# Patient Record
Sex: MALE | Race: WHITE | Employment: UNEMPLOYED | URBAN - METROPOLITAN AREA
[De-identification: names, ages, dates, MRNs, and addresses within clinical notes are randomized per-mention and may not be internally consistent; named-entity substitution may affect disease eponyms.]

---

## 2017-02-16 ENCOUNTER — TELEPHONE (OUTPATIENT)
Dept: FAMILY MEDICINE CLINIC | Facility: CLINIC | Age: 22
End: 2017-02-16

## 2017-02-16 DIAGNOSIS — Q90.9 DOWN'S SYNDROME: Primary | ICD-10-CM

## 2017-02-17 ENCOUNTER — TELEPHONE (OUTPATIENT)
Dept: OTOLARYNGOLOGY | Facility: CLINIC | Age: 22
End: 2017-02-17

## 2017-02-17 RX ORDER — AZELASTINE 1 MG/ML
1 SPRAY, METERED NASAL 2 TIMES DAILY
Qty: 1 BOTTLE | Refills: 2 | Status: SHIPPED | OUTPATIENT
Start: 2017-02-17 | End: 2017-06-20

## 2017-02-17 RX ORDER — LORATADINE 10 MG/1
10 TABLET ORAL DAILY
Qty: 30 TABLET | Refills: 3 | Status: SHIPPED | OUTPATIENT
Start: 2017-02-17 | End: 2017-06-20

## 2017-02-17 RX ORDER — MONTELUKAST SODIUM 10 MG/1
10 TABLET ORAL NIGHTLY
Qty: 30 TABLET | Refills: 3 | Status: SHIPPED | OUTPATIENT
Start: 2017-02-17 | End: 2017-06-20

## 2017-02-17 NOTE — TELEPHONE ENCOUNTER
Pt's LOV 10/18/16. Pt requesting medication refill. Also, pt rec'd Rx of Azelastine from Dr. Dylan Toscano in 10/2016; pt's father requesting refill of that as well. Please advise if okay to refill Singulair and Loratadine, as well as Azelastine spray.

## 2017-02-17 NOTE — TELEPHONE ENCOUNTER
pts Father, Vanesa Maguire called. Could you please refill, Father states these meds work great for his son and he is completely out. He states there are no more refills. Please advise.      Azelastine 30  spray     Montelukast Sodium 10 MG Oral Tab Take 1 ta

## 2017-06-05 ENCOUNTER — OFFICE VISIT (OUTPATIENT)
Dept: OPHTHALMOLOGY | Facility: CLINIC | Age: 22
End: 2017-06-05

## 2017-06-05 DIAGNOSIS — Q90.9 DOWN'S SYNDROME: ICD-10-CM

## 2017-06-05 DIAGNOSIS — H01.00A BLEPHARITIS OF UPPER AND LOWER EYELIDS OF BOTH EYES, UNSPECIFIED TYPE: Primary | ICD-10-CM

## 2017-06-05 DIAGNOSIS — H01.00B BLEPHARITIS OF UPPER AND LOWER EYELIDS OF BOTH EYES, UNSPECIFIED TYPE: Primary | ICD-10-CM

## 2017-06-05 DIAGNOSIS — H52.203 HYPEROPIA OF BOTH EYES WITH ASTIGMATISM: ICD-10-CM

## 2017-06-05 DIAGNOSIS — H52.03 HYPEROPIA OF BOTH EYES WITH ASTIGMATISM: ICD-10-CM

## 2017-06-05 PROCEDURE — 99243 OFF/OP CNSLTJ NEW/EST LOW 30: CPT | Performed by: OPHTHALMOLOGY

## 2017-06-05 PROCEDURE — 92015 DETERMINE REFRACTIVE STATE: CPT | Performed by: OPHTHALMOLOGY

## 2017-06-05 PROCEDURE — 99212 OFFICE O/P EST SF 10 MIN: CPT | Performed by: OPHTHALMOLOGY

## 2017-06-05 NOTE — PATIENT INSTRUCTIONS
Down's syndrome  Doing well    Hyperopia of both eyes with astigmatism  New Rx    Blepharitis  Patient instructed to use lid hygiene twice daily. Apply baby shampoo to warm washcloth and scrub eyelids gently with eyes closed, then rinse thoroughly.

## 2017-06-05 NOTE — PROGRESS NOTES
Charanjit Shafer is a 20year old male. HPI:     HPI     EP/ 20 yr old here for a complete exam. 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  Pt states that vision is fine, mom states evelia Gastrointestinal, Neurological, Skin, Genitourinary, Musculoskeletal, HENT, Endocrine, Cardiovascular, Respiratory, Psychiatric, Allergic/Imm, Heme/Lymph    Last edited by Mohinder Le O.T. on 6/5/2017  1:05 PM. (History)          PHYSICAL EXAM: twice daily. Apply baby shampoo to warm washcloth and scrub eyelids gently with eyes closed, then rinse thoroughly. No orders of the defined types were placed in this encounter.        Meds This Visit:    No prescriptions requested or ordered in thi

## 2017-06-20 RX ORDER — AZELASTINE 1 MG/ML
1 SPRAY, METERED NASAL 2 TIMES DAILY
Qty: 1 BOTTLE | Refills: 2 | Status: SHIPPED | OUTPATIENT
Start: 2017-06-20 | End: 2017-10-28

## 2017-06-20 RX ORDER — MONTELUKAST SODIUM 10 MG/1
10 TABLET ORAL NIGHTLY
Qty: 30 TABLET | Refills: 3 | Status: SHIPPED | OUTPATIENT
Start: 2017-06-20 | End: 2017-10-28

## 2017-06-20 RX ORDER — LORATADINE 10 MG/1
10 TABLET ORAL DAILY
Qty: 30 TABLET | Refills: 3 | Status: SHIPPED | OUTPATIENT
Start: 2017-06-20 | End: 2017-10-28

## 2017-06-20 NOTE — TELEPHONE ENCOUNTER
pts Mom, Micky Jory called following up on refills, pt is out. She states he has been waiting for days for the refill? Please advise    Montelukast Sodium 10 MG Oral Tab Take 1 tablet (10 mg total) by mouth nightly.  Disp: 30 tablet Rfl: 3   loratadine 10 MG

## 2017-09-01 ENCOUNTER — OFFICE VISIT (OUTPATIENT)
Dept: FAMILY MEDICINE CLINIC | Facility: CLINIC | Age: 22
End: 2017-09-01

## 2017-09-01 VITALS
DIASTOLIC BLOOD PRESSURE: 81 MMHG | HEART RATE: 85 BPM | BODY MASS INDEX: 37.89 KG/M2 | SYSTOLIC BLOOD PRESSURE: 145 MMHG | HEIGHT: 60 IN | WEIGHT: 193 LBS

## 2017-09-01 DIAGNOSIS — Z00.00 WELL ADULT EXAM: Primary | ICD-10-CM

## 2017-09-01 DIAGNOSIS — J30.2 CHRONIC SEASONAL ALLERGIC RHINITIS DUE TO OTHER ALLERGEN: ICD-10-CM

## 2017-09-01 DIAGNOSIS — H52.03 HYPEROPIA OF BOTH EYES WITH ASTIGMATISM: ICD-10-CM

## 2017-09-01 DIAGNOSIS — Q90.9 DOWN'S SYNDROME: ICD-10-CM

## 2017-09-01 DIAGNOSIS — H52.203 HYPEROPIA OF BOTH EYES WITH ASTIGMATISM: ICD-10-CM

## 2017-09-01 PROCEDURE — 99395 PREV VISIT EST AGE 18-39: CPT | Performed by: NURSE PRACTITIONER

## 2017-09-01 NOTE — PROGRESS NOTES
HPI  Pt here for work physical. Will be working at [employer] with  in the building-will need TB test.     Review of Systems   Constitutional: Negative for activity change, appetite change and fever. HENT: Positive for congestion and rhinorrhea.  Neg History  None on file     Social History Main Topics   Smoking status: Never Smoker    Smokeless tobacco: Not on file    Alcohol use No    Drug use: Unknown     Other Topics Concern    Caffeine Concern No     Social History Narrative   None on file noted. No erythema. Psychiatric: He has a normal mood and affect. His behavior is normal. Judgment and thought content normal.   Nursing note and vitals reviewed. Assessment:     1. Well adult exam    2. Down's syndrome    3.  Hyperopia of both eyes

## 2017-09-05 ENCOUNTER — NURSE ONLY (OUTPATIENT)
Dept: FAMILY MEDICINE CLINIC | Facility: CLINIC | Age: 22
End: 2017-09-05

## 2017-09-05 DIAGNOSIS — Z11.1 SCREENING-PULMONARY TB: Primary | ICD-10-CM

## 2017-09-05 PROCEDURE — 86580 TB INTRADERMAL TEST: CPT | Performed by: FAMILY MEDICINE

## 2017-09-05 NOTE — PROGRESS NOTES
Patient is here for his TB test. Patient and mother advise to schedule RN visit for tb reading within 48-72 hours.  Test placed on right forearm at 4:16pm.

## 2017-09-07 ENCOUNTER — NURSE ONLY (OUTPATIENT)
Dept: FAMILY MEDICINE CLINIC | Facility: CLINIC | Age: 22
End: 2017-09-07

## 2017-09-07 DIAGNOSIS — Z11.1 SCREENING-PULMONARY TB: Primary | ICD-10-CM

## 2017-09-07 LAB — INDURATION (): 0 MM (ref 0–11)

## 2017-09-07 NOTE — PROCEDURES
Patient here for nurse visit for TB reading. Mary Genevievejeannie 53Allan name and  were verified with patient. TB site was a little red so site was also checked by Blas Cruz, KAROLINA who verified it as Negative TB.  Results were documented and work physical form was compl

## 2017-10-17 NOTE — TELEPHONE ENCOUNTER
Pt's father called to check on the 2 rx. For refill. Called the pharm and was told they have not heard back from the office. Please send the rx.

## 2017-10-18 RX ORDER — MONTELUKAST SODIUM 10 MG/1
TABLET ORAL
Qty: 30 TABLET | Refills: 3 | OUTPATIENT
Start: 2017-10-18

## 2017-10-18 RX ORDER — LORATADINE 10 MG/1
TABLET ORAL
Qty: 30 TABLET | Refills: 3 | OUTPATIENT
Start: 2017-10-18

## 2017-10-18 NOTE — TELEPHONE ENCOUNTER
Pts father scheduled appt on 10/24, asking if he can get rx until appt. Upset he didn't get cb yesterday.

## 2017-10-21 ENCOUNTER — TELEPHONE (OUTPATIENT)
Dept: OTOLARYNGOLOGY | Facility: CLINIC | Age: 22
End: 2017-10-21

## 2017-10-23 RX ORDER — MONTELUKAST SODIUM 10 MG/1
TABLET ORAL
Qty: 30 TABLET | Refills: 0 | OUTPATIENT
Start: 2017-10-23

## 2017-10-23 RX ORDER — LORATADINE 10 MG/1
TABLET ORAL
Qty: 30 TABLET | Refills: 0 | OUTPATIENT
Start: 2017-10-23

## 2017-10-27 ENCOUNTER — TELEPHONE (OUTPATIENT)
Dept: OTHER | Age: 22
End: 2017-10-27

## 2017-10-27 DIAGNOSIS — H61.20 IMPACTED CERUMEN, UNSPECIFIED LATERALITY: Primary | ICD-10-CM

## 2017-10-27 NOTE — TELEPHONE ENCOUNTER
Referral sent, Pt visits regularly and annually Hx sinus /wax build up  Left message with Pt since his father was not available- referral sent -Appt is tomorrow

## 2017-10-27 NOTE — TELEPHONE ENCOUNTER
Informed pt's dad Dr. Hayden Garay has left the office, he asked to have referral request sent to Dr. Camden Moreau. Pt does have an appt scheduled 9:30 AM tomorrow. Please advise.

## 2017-10-27 NOTE — TELEPHONE ENCOUNTER
Dad called to get a referral for Dr. Ivey. State pt is having a problem with ears, has a lot of wax in his ears and is having some dizziness.  Pt has an appt scheduled Tuesday 10/21/17 and needs this referral, would like pt seen tomorrow and Dr. Ivey do

## 2017-10-28 ENCOUNTER — OFFICE VISIT (OUTPATIENT)
Dept: OTOLARYNGOLOGY | Facility: CLINIC | Age: 22
End: 2017-10-28

## 2017-10-28 ENCOUNTER — TELEPHONE (OUTPATIENT)
Dept: OTOLARYNGOLOGY | Facility: CLINIC | Age: 22
End: 2017-10-28

## 2017-10-28 VITALS — HEIGHT: 60 IN | BODY MASS INDEX: 37.89 KG/M2 | WEIGHT: 193 LBS | TEMPERATURE: 98 F

## 2017-10-28 DIAGNOSIS — H61.23 BILATERAL IMPACTED CERUMEN: Primary | ICD-10-CM

## 2017-10-28 DIAGNOSIS — J30.89 CHRONIC NON-SEASONAL ALLERGIC RHINITIS, UNSPECIFIED TRIGGER: ICD-10-CM

## 2017-10-28 PROCEDURE — 69210 REMOVE IMPACTED EAR WAX UNI: CPT | Performed by: OTOLARYNGOLOGY

## 2017-10-28 PROCEDURE — 99213 OFFICE O/P EST LOW 20 MIN: CPT | Performed by: OTOLARYNGOLOGY

## 2017-10-28 PROCEDURE — 99212 OFFICE O/P EST SF 10 MIN: CPT | Performed by: OTOLARYNGOLOGY

## 2017-10-28 RX ORDER — AZELASTINE 1 MG/ML
1 SPRAY, METERED NASAL 2 TIMES DAILY
Qty: 1 BOTTLE | Refills: 2 | Status: CANCELLED | OUTPATIENT
Start: 2017-10-28

## 2017-10-28 RX ORDER — MONTELUKAST SODIUM 10 MG/1
10 TABLET ORAL NIGHTLY
Qty: 30 TABLET | Refills: 3 | Status: SHIPPED | OUTPATIENT
Start: 2017-10-28 | End: 2017-10-28

## 2017-10-28 RX ORDER — AZELASTINE 1 MG/ML
1 SPRAY, METERED NASAL 2 TIMES DAILY
Qty: 1 BOTTLE | Refills: 11 | Status: SHIPPED | OUTPATIENT
Start: 2017-10-28 | End: 2020-03-30

## 2017-10-28 RX ORDER — MONTELUKAST SODIUM 10 MG/1
10 TABLET ORAL NIGHTLY
Qty: 30 TABLET | Refills: 11 | Status: SHIPPED | OUTPATIENT
Start: 2017-10-28 | End: 2020-01-20 | Stop reason: ALTCHOICE

## 2017-10-28 RX ORDER — LORATADINE 10 MG/1
10 TABLET ORAL DAILY
Qty: 30 TABLET | Refills: 11 | Status: SHIPPED | OUTPATIENT
Start: 2017-10-28 | End: 2018-03-30

## 2017-10-28 RX ORDER — LORATADINE 10 MG/1
10 TABLET ORAL DAILY
Qty: 30 TABLET | Refills: 3 | Status: SHIPPED | OUTPATIENT
Start: 2017-10-28 | End: 2017-10-28

## 2017-10-28 NOTE — PROGRESS NOTES
Luma Sweet is a 20year old male. Patient presents with:  Ear Problem: Pt experience dizziness, pain in both ears x1 day  Refill Request        HISTORY OF PRESENT ILLNESS  10/28/2017   Here for evaluation of   hearing loss.  Patient feels this has wo LASER-ASSISTED      REVIEW OF SYSTEMS    System Neg/Pos Details   Constitutional Negative fever, weight loss. ENMT Negative Headaches vertigo    Eyes Negative Blurred vision and vision changes. Respiratory Negative Dyspnea and wheezing.    Cardio Reynaldo Amirah total) by mouth daily. , Disp: 30 tablet, Rfl: 11  •  Montelukast Sodium 10 MG Oral Tab, Take 1 tablet (10 mg total) by mouth nightly., Disp: 30 tablet, Rfl: 11  •  Azelastine HCl 0.1 % Nasal Solution, 1 spray by Nasal route 2 (two) times daily. , Disp: 1 Durga

## 2017-11-04 RX ORDER — AZELASTINE 1 MG/ML
SPRAY, METERED NASAL
Qty: 30 ML | Refills: 3 | Status: SHIPPED | OUTPATIENT
Start: 2017-11-04 | End: 2018-06-18

## 2017-11-04 NOTE — TELEPHONE ENCOUNTER
Dr [facility] to advise on pended fluticasone 50 mcg refill request. Not on med list.    Refill Protocol Appointment Criteria  · Appointment scheduled in the past 12 months or in the next 3 months  Recent Outpatient Visits            1 week ago Bilateral impacted

## 2017-11-07 RX ORDER — FLUTICASONE PROPIONATE 50 MCG
SPRAY, SUSPENSION (ML) NASAL
Qty: 3 INHALER | Refills: 3 | OUTPATIENT
Start: 2017-11-07

## 2017-11-08 NOTE — TELEPHONE ENCOUNTER
Routing comment from Dr Rhonda Sandy 11/6/17 Please ask mo if taking flonase or dymista cant take both.  (Routing comment)   Parent contacted, she states that they do not need a refill on nasal spray, erx was cancelled

## 2018-02-20 RX ORDER — LORATADINE 10 MG/1
TABLET ORAL
Qty: 30 TABLET | Refills: 3 | Status: SHIPPED | OUTPATIENT
Start: 2018-02-20 | End: 2020-10-27

## 2018-02-20 NOTE — TELEPHONE ENCOUNTER
Pt last office visit on 10/28/17 for allergic non seasonal rhinitis. Pt requesting a refill.  Please advise

## 2018-03-30 ENCOUNTER — LAB ENCOUNTER (OUTPATIENT)
Dept: LAB | Age: 23
End: 2018-03-30
Attending: PHYSICIAN ASSISTANT
Payer: COMMERCIAL

## 2018-03-30 ENCOUNTER — OFFICE VISIT (OUTPATIENT)
Dept: FAMILY MEDICINE CLINIC | Facility: CLINIC | Age: 23
End: 2018-03-30

## 2018-03-30 VITALS
HEIGHT: 60 IN | SYSTOLIC BLOOD PRESSURE: 128 MMHG | WEIGHT: 198 LBS | HEART RATE: 80 BPM | DIASTOLIC BLOOD PRESSURE: 82 MMHG | BODY MASS INDEX: 38.87 KG/M2

## 2018-03-30 DIAGNOSIS — M10.9 ACUTE GOUT INVOLVING TOE OF RIGHT FOOT, UNSPECIFIED CAUSE: ICD-10-CM

## 2018-03-30 DIAGNOSIS — M10.9 ACUTE GOUT INVOLVING TOE OF RIGHT FOOT, UNSPECIFIED CAUSE: Primary | ICD-10-CM

## 2018-03-30 PROCEDURE — 99212 OFFICE O/P EST SF 10 MIN: CPT | Performed by: PHYSICIAN ASSISTANT

## 2018-03-30 PROCEDURE — 80053 COMPREHEN METABOLIC PANEL: CPT

## 2018-03-30 PROCEDURE — 36415 COLL VENOUS BLD VENIPUNCTURE: CPT

## 2018-03-30 PROCEDURE — 84550 ASSAY OF BLOOD/URIC ACID: CPT

## 2018-03-30 PROCEDURE — 80050 GENERAL HEALTH PANEL: CPT

## 2018-03-30 PROCEDURE — 99213 OFFICE O/P EST LOW 20 MIN: CPT | Performed by: PHYSICIAN ASSISTANT

## 2018-03-30 PROCEDURE — 84443 ASSAY THYROID STIM HORMONE: CPT

## 2018-03-30 RX ORDER — INDOMETHACIN 50 MG/1
50 CAPSULE ORAL
Qty: 30 CAPSULE | Refills: 0 | Status: SHIPPED | OUTPATIENT
Start: 2018-03-30 | End: 2018-09-10

## 2018-03-30 NOTE — PROGRESS NOTES
HPI:    Patient ID: Gayatri Mathews is a 24year old male. Patient presents with his father for evaluation of painful right great toe for past three days. Patient denies any injury. He has not noticed redness or swelling in toe.   He denies pain prox well-developed and well-nourished. No distress. Cardiovascular: Normal rate, regular rhythm and normal heart sounds. Pulmonary/Chest: Effort normal and breath sounds normal.   Musculoskeletal:   Right great toe at MTP joint tender to palpation.   Pain

## 2018-04-02 ENCOUNTER — TELEPHONE (OUTPATIENT)
Dept: FAMILY MEDICINE CLINIC | Facility: CLINIC | Age: 23
End: 2018-04-02

## 2018-04-02 DIAGNOSIS — E03.9 HYPOTHYROIDISM, UNSPECIFIED TYPE: ICD-10-CM

## 2018-04-02 DIAGNOSIS — R74.8 ELEVATED LIVER ENZYMES: Primary | ICD-10-CM

## 2018-04-02 RX ORDER — LEVOTHYROXINE SODIUM 0.03 MG/1
25 TABLET ORAL
Qty: 30 TABLET | Refills: 2 | Status: SHIPPED | OUTPATIENT
Start: 2018-04-02 | End: 2020-01-20 | Stop reason: ALTCHOICE

## 2018-04-02 NOTE — TELEPHONE ENCOUNTER
Left detailed message regarding lab results. Uric acid level is elevated. Patient should follow up if toe pain is not improving with indomethacin. He should adhere to low purine diet and follow up if any joint pain recurs. His TSH level is high at 12. 0

## 2018-06-18 ENCOUNTER — TELEPHONE (OUTPATIENT)
Dept: FAMILY MEDICINE CLINIC | Facility: CLINIC | Age: 23
End: 2018-06-18

## 2018-06-18 DIAGNOSIS — R09.81 SINUS CONGESTION: Primary | ICD-10-CM

## 2018-06-18 DIAGNOSIS — H61.20 IMPACTED CERUMEN, UNSPECIFIED LATERALITY: ICD-10-CM

## 2018-06-18 NOTE — TELEPHONE ENCOUNTER
Spoke with pt's father, Carly Ashby, and informed that Dr Levi Santiago would like to see the pt in the office since he has not been seen since 2016, pt's dad would like to know if there is any way you could refill his nasal spray for now and he will schedule an appoin

## 2018-06-18 NOTE — TELEPHONE ENCOUNTER
Patients father checking status on nasal spray refill. Would also like another refill on the montelukast and loratadine. Please advise and call once pharm receives medication.  Thank you

## 2018-06-18 NOTE — TELEPHONE ENCOUNTER
Patient called requesting a referral to see Dr. Carmen Maki for a possible apt tomorrow. Pended referral. Please review diagnosis and sign off if you agree.     Thank you,  [facility] [phone]

## 2018-06-19 RX ORDER — AZELASTINE 1 MG/ML
SPRAY, METERED NASAL
Qty: 30 ML | Refills: 3 | Status: SHIPPED | OUTPATIENT
Start: 2018-06-19 | End: 2018-11-24

## 2018-06-20 RX ORDER — LORATADINE 10 MG/1
10 TABLET ORAL DAILY
Qty: 30 TABLET | Refills: 2 | Status: SHIPPED | OUTPATIENT
Start: 2018-06-20 | End: 2020-01-20

## 2018-06-20 RX ORDER — MONTELUKAST SODIUM 10 MG/1
10 TABLET ORAL NIGHTLY
Qty: 30 TABLET | Refills: 2 | Status: SHIPPED | OUTPATIENT
Start: 2018-06-20 | End: 2020-01-20

## 2018-06-20 NOTE — TELEPHONE ENCOUNTER
Left message for pt to call back to inform that nasal spray, loratadine, and singulair were all sent to his pharmacy with 2 refills.

## 2018-06-20 NOTE — TELEPHONE ENCOUNTER
He may have a refill on his loratadine and Singulair as well 10 mg each #30 each with 2 refills.   They can schedule an appointment to see me at their convenience

## 2018-07-19 RX ORDER — LORATADINE 10 MG/1
TABLET ORAL
Qty: 30 TABLET | Refills: 3 | OUTPATIENT
Start: 2018-07-19

## 2018-07-20 NOTE — TELEPHONE ENCOUNTER
He can purchase this medication over-the-counter without seeing me. If he wishes to have the medication prescribed I would recommend that he schedule appointment to see me.

## 2018-09-10 ENCOUNTER — TELEPHONE (OUTPATIENT)
Dept: FAMILY MEDICINE CLINIC | Facility: CLINIC | Age: 23
End: 2018-09-10

## 2018-09-10 RX ORDER — INDOMETHACIN 50 MG/1
50 CAPSULE ORAL
Qty: 90 CAPSULE | Refills: 0 | Status: SHIPPED | OUTPATIENT
Start: 2018-09-10 | End: 2021-07-20

## 2018-09-10 NOTE — TELEPHONE ENCOUNTER
Father states pt has Down syndrome and dropped a full bottle of indomethacin in the toilette (d/t deformity of fingers) and just needs a refill. States the indomethacin is used when pt has a gout flare up but does not believe an OV is needed.  Repeatedly st

## 2018-09-11 NOTE — TELEPHONE ENCOUNTER
Spoke with father of patient and informed him that the rx for the indomethacin was sent to the pharmacy. Father voiced understanding.

## 2018-10-19 RX ORDER — LORATADINE 10 MG/1
TABLET ORAL
Qty: 30 TABLET | Refills: 3 | OUTPATIENT
Start: 2018-10-19

## 2018-10-23 NOTE — TELEPHONE ENCOUNTER
, pt asking if he can have 1 months refill for Singulair and Claritin, pt will schedule appointment , please advise

## 2018-10-23 NOTE — TELEPHONE ENCOUNTER
Patients father checking refill on montelukast and loratadine. States pharm is not able to refill. Please call. Thank you.

## 2018-10-24 RX ORDER — LORATADINE 10 MG/1
10 TABLET ORAL DAILY
Qty: 30 TABLET | Refills: 0 | Status: SHIPPED | OUTPATIENT
Start: 2018-10-24 | End: 2018-11-24

## 2018-10-24 RX ORDER — MONTELUKAST SODIUM 10 MG/1
10 TABLET ORAL NIGHTLY
Qty: 30 TABLET | Refills: 0 | Status: SHIPPED | OUTPATIENT
Start: 2018-10-24 | End: 2018-11-24

## 2018-11-01 NOTE — TELEPHONE ENCOUNTER
Spoke to pt's father who is his caregiver and father stts that the medication is prescribed by Dr. Angelique Haas (ENT) not Dr. Francisca Cruz.  Please advise

## 2018-11-02 RX ORDER — FLUTICASONE PROPIONATE 50 MCG
SPRAY, SUSPENSION (ML) NASAL
Refills: 0 | OUTPATIENT
Start: 2018-11-02

## 2018-11-24 ENCOUNTER — OFFICE VISIT (OUTPATIENT)
Dept: OTOLARYNGOLOGY | Facility: CLINIC | Age: 23
End: 2018-11-24
Payer: MEDICAID

## 2018-11-24 VITALS
TEMPERATURE: 98 F | BODY MASS INDEX: 36.52 KG/M2 | DIASTOLIC BLOOD PRESSURE: 80 MMHG | WEIGHT: 186 LBS | SYSTOLIC BLOOD PRESSURE: 120 MMHG | HEIGHT: 60 IN

## 2018-11-24 DIAGNOSIS — R09.81 NASAL CONGESTION: Primary | ICD-10-CM

## 2018-11-24 DIAGNOSIS — H61.22 IMPACTED CERUMEN OF LEFT EAR: ICD-10-CM

## 2018-11-24 PROCEDURE — 69210 REMOVE IMPACTED EAR WAX UNI: CPT | Performed by: OTOLARYNGOLOGY

## 2018-11-24 PROCEDURE — 99214 OFFICE O/P EST MOD 30 MIN: CPT | Performed by: OTOLARYNGOLOGY

## 2018-11-24 PROCEDURE — 99212 OFFICE O/P EST SF 10 MIN: CPT | Performed by: OTOLARYNGOLOGY

## 2018-11-24 RX ORDER — METHSCOPOLAMINE BROMIDE 2.5 MG/1
2.5 TABLET ORAL 2 TIMES DAILY
Qty: 60 TABLET | Refills: 3 | Status: SHIPPED | OUTPATIENT
Start: 2018-11-24 | End: 2020-01-20 | Stop reason: ALTCHOICE

## 2018-11-24 RX ORDER — AZELASTINE 1 MG/ML
SPRAY, METERED NASAL
Qty: 3 BOTTLE | Refills: 3 | Status: SHIPPED | OUTPATIENT
Start: 2018-11-24 | End: 2020-01-20

## 2018-11-24 RX ORDER — MONTELUKAST SODIUM 10 MG/1
10 TABLET ORAL NIGHTLY
Qty: 90 TABLET | Refills: 3 | Status: SHIPPED | OUTPATIENT
Start: 2018-11-24 | End: 2020-01-20

## 2018-11-24 RX ORDER — LORATADINE 10 MG/1
10 TABLET ORAL DAILY
Qty: 90 TABLET | Refills: 0 | Status: SHIPPED | OUTPATIENT
Start: 2018-11-24 | End: 2019-02-25

## 2018-11-24 NOTE — PROGRESS NOTES
Delphine Veras is a 24year old male.   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 Supraclavicular.         Nose/Mouth/Throat Normal External nose - Normal. Lips/teeth/gums - Normal. Tonsils - Normal. Oropharynx - Normal.   Nose/Mouth/Throat Normal Nares - Right: Normal Left: Normal. Septum -Normal  Turbinates - Right: Normal, L congestion  Continue Singulair loratadine and Astelin nasal spray. I will start him on methscopolamine to be used as needed for chronic postnasal discharge. Return to clinic in 1 year for routine refills.     2. Impacted cerumen of left ear  Left ear ceru

## 2019-02-25 ENCOUNTER — TELEPHONE (OUTPATIENT)
Dept: OTOLARYNGOLOGY | Facility: CLINIC | Age: 24
End: 2019-02-25

## 2019-02-27 RX ORDER — LORATADINE 10 MG/1
TABLET ORAL
Qty: 90 TABLET | Refills: 1 | Status: SHIPPED | OUTPATIENT
Start: 2019-02-27 | End: 2019-08-22

## 2019-08-22 RX ORDER — LORATADINE 10 MG/1
TABLET ORAL
Qty: 90 TABLET | Refills: 0 | Status: SHIPPED | OUTPATIENT
Start: 2019-08-22 | End: 2020-01-20

## 2019-11-20 NOTE — TELEPHONE ENCOUNTER
Pt's father called. Caller had requested refills for 2 rx. [facility]ru [facility]. Pt is out of the rx. Will you refill?   Please call

## 2019-11-21 RX ORDER — LORATADINE 10 MG/1
TABLET ORAL
Qty: 90 TABLET | Refills: 0 | OUTPATIENT
Start: 2019-11-21

## 2019-11-21 RX ORDER — MONTELUKAST SODIUM 10 MG/1
TABLET ORAL
Qty: 90 TABLET | Refills: 0 | OUTPATIENT
Start: 2019-11-21

## 2019-12-27 ENCOUNTER — TELEPHONE (OUTPATIENT)
Dept: FAMILY MEDICINE CLINIC | Facility: CLINIC | Age: 24
End: 2019-12-27

## 2019-12-27 NOTE — TELEPHONE ENCOUNTER
Called and spoke to pt's mother, Yolie Valverde, informed her that Dr. [facility] does not have any sooner appts and will have to wait until January 20th to get forms signed by him. Otherwise, she can see Dr. ELGIN LYNN Niles AT [facility] physician assistant or nurse practitioner.    Per Yolie Valverde,

## 2019-12-27 NOTE — TELEPHONE ENCOUNTER
Patient mom calling and states she have some forms that need to be filled out for her son for insurance purpose he have Down Syndrome she will like to drop the papers off his appointment is not until January 20 but want to have them filled out before the a

## 2020-01-20 ENCOUNTER — OFFICE VISIT (OUTPATIENT)
Dept: FAMILY MEDICINE CLINIC | Facility: CLINIC | Age: 25
End: 2020-01-20
Payer: MEDICAID

## 2020-01-20 VITALS
HEIGHT: 60 IN | BODY MASS INDEX: 34.16 KG/M2 | HEART RATE: 73 BPM | DIASTOLIC BLOOD PRESSURE: 68 MMHG | WEIGHT: 174 LBS | RESPIRATION RATE: 20 BRPM | SYSTOLIC BLOOD PRESSURE: 114 MMHG

## 2020-01-20 DIAGNOSIS — E03.9 HYPOTHYROIDISM, UNSPECIFIED TYPE: ICD-10-CM

## 2020-01-20 DIAGNOSIS — Z23 ENCOUNTER FOR IMMUNIZATION: ICD-10-CM

## 2020-01-20 DIAGNOSIS — H52.03 HYPEROPIA OF BOTH EYES WITH ASTIGMATISM: ICD-10-CM

## 2020-01-20 DIAGNOSIS — H52.203 HYPEROPIA OF BOTH EYES WITH ASTIGMATISM: ICD-10-CM

## 2020-01-20 DIAGNOSIS — Z00.00 ANNUAL PHYSICAL EXAM: Primary | ICD-10-CM

## 2020-01-20 PROCEDURE — 90715 TDAP VACCINE 7 YRS/> IM: CPT | Performed by: FAMILY MEDICINE

## 2020-01-20 PROCEDURE — 99385 PREV VISIT NEW AGE 18-39: CPT | Performed by: FAMILY MEDICINE

## 2020-01-20 PROCEDURE — 90471 IMMUNIZATION ADMIN: CPT | Performed by: FAMILY MEDICINE

## 2020-01-20 NOTE — PROGRESS NOTES
Here for phsyical    Hx of downs syndrome  And thyroid disease    Patient's past medical surgical family social history was reviewed.     Review of Systems  Allergic: no environmental allergies or food allergies  Cardiovascular: no chest pain, irregular hea PEROXIDASE (TPO) AB; Future  - FREE T4 (FREE THYROXINE); Future  - TRIIODOTHYRONINE (T3) TOTAL; Future  - COMP METABOLIC PANEL (14); Future  - LIPID PANEL; Future  - CBC WITH DIFFERENTIAL WITH PLATELET;  Future  - TETANUS, DIPHTHERIA TOXOIDS AND ACELLULAR P

## 2020-03-04 RX ORDER — AZELASTINE 1 MG/ML
SPRAY, METERED NASAL
Qty: 90 ML | Refills: 0 | OUTPATIENT
Start: 2020-03-04

## 2020-03-12 ENCOUNTER — TELEPHONE (OUTPATIENT)
Dept: FAMILY MEDICINE CLINIC | Facility: CLINIC | Age: 25
End: 2020-03-12

## 2020-03-12 DIAGNOSIS — R09.81 SINUS CONGESTION: Primary | ICD-10-CM

## 2020-03-12 NOTE — TELEPHONE ENCOUNTER
Father is requesting a referral for the patient to see Dr. Yolie Mendosa in ENT for follow up on medication. Father, tried calling for a refill, but, was told that the patient needs to see the doctor first for his refill.  Pt does not have an appointment scheduled

## 2020-03-12 NOTE — TELEPHONE ENCOUNTER
Dr. Kayla Marquez, patient is requesting a referral for Dr. Katie Maza. Referral has been pended, please advise.

## 2020-03-16 NOTE — TELEPHONE ENCOUNTER
Dr Johnson Niece patient father stated he was trying to schedule pt to see you but now cannot do it due to COVID 19 pt has down syndrome,pt is out of medication pt father is asking if pt can get a refill,please advise.

## 2020-03-17 RX ORDER — AZELASTINE 1 MG/ML
1 SPRAY, METERED NASAL 2 TIMES DAILY
Qty: 1 BOTTLE | Refills: 1 | Status: SHIPPED | OUTPATIENT
Start: 2020-03-17

## 2020-03-18 ENCOUNTER — TELEPHONE (OUTPATIENT)
Dept: OTOLARYNGOLOGY | Facility: CLINIC | Age: 25
End: 2020-03-18

## 2020-03-18 NOTE — TELEPHONE ENCOUNTER
Pt needs clarification on azelastine quantity. Please call thank you. Aware office is closed for the day.

## 2020-03-30 ENCOUNTER — TELEPHONE (OUTPATIENT)
Dept: OTOLARYNGOLOGY | Facility: CLINIC | Age: 25
End: 2020-03-30

## 2020-03-30 RX ORDER — AZELASTINE 1 MG/ML
1 SPRAY, METERED NASAL 2 TIMES DAILY
Qty: 1 BOTTLE | Refills: 3 | Status: SHIPPED | OUTPATIENT
Start: 2020-03-30 | End: 2020-10-27

## 2020-03-30 NOTE — TELEPHONE ENCOUNTER
[facility] called. Pt was given a 2 months supply azelastine nasal spray. Pt is asking for a 3 months supply.    Call to advise

## 2020-10-27 ENCOUNTER — NURSE TRIAGE (OUTPATIENT)
Dept: INTERNAL MEDICINE CLINIC | Facility: CLINIC | Age: 25
End: 2020-10-27

## 2020-10-27 ENCOUNTER — TELEMEDICINE (OUTPATIENT)
Dept: FAMILY MEDICINE CLINIC | Facility: CLINIC | Age: 25
End: 2020-10-27
Payer: MEDICAID

## 2020-10-27 DIAGNOSIS — R09.81 SINUS CONGESTION: Primary | ICD-10-CM

## 2020-10-27 RX ORDER — AZELASTINE 1 MG/ML
2 SPRAY, METERED NASAL 2 TIMES DAILY
Qty: 1 BOTTLE | Refills: 5 | Status: SHIPPED | OUTPATIENT
Start: 2020-10-27 | End: 2020-10-27

## 2020-10-27 RX ORDER — LORATADINE 10 MG/1
10 TABLET ORAL DAILY
Qty: 90 TABLET | Refills: 3 | Status: SHIPPED | OUTPATIENT
Start: 2020-10-27 | End: 2021-08-25

## 2020-10-27 RX ORDER — MONTELUKAST SODIUM 10 MG/1
10 TABLET ORAL NIGHTLY
Qty: 90 TABLET | Refills: 1 | Status: SHIPPED | OUTPATIENT
Start: 2020-10-27 | End: 2021-04-27

## 2020-10-27 NOTE — PROGRESS NOTES
Phone visit    Patient interviewed through father. Patient has had sinus issues for years. Nasal congestion has been worse lately but responding to Mucinex. Patient has not taken Claritin, Singulair or Astelin spray he was previously using.   No cough no

## 2020-10-27 NOTE — TELEPHONE ENCOUNTER
Per Dr. Niko Davies   The father was called and changed. Per the father only has a land line that works. Visit changed.

## 2021-02-04 ENCOUNTER — OFFICE VISIT (OUTPATIENT)
Dept: OPHTHALMOLOGY | Facility: CLINIC | Age: 26
End: 2021-02-04
Payer: MEDICAID

## 2021-02-04 DIAGNOSIS — H52.203 HYPEROPIA OF BOTH EYES WITH ASTIGMATISM: Primary | ICD-10-CM

## 2021-02-04 DIAGNOSIS — Q90.9 DOWN'S SYNDROME: ICD-10-CM

## 2021-02-04 DIAGNOSIS — H52.03 HYPEROPIA OF BOTH EYES WITH ASTIGMATISM: Primary | ICD-10-CM

## 2021-02-04 DIAGNOSIS — H49.11 SUPERIOR OBLIQUE PALSY, RIGHT: ICD-10-CM

## 2021-02-04 PROCEDURE — 92015 DETERMINE REFRACTIVE STATE: CPT | Performed by: OPHTHALMOLOGY

## 2021-02-04 PROCEDURE — 92004 COMPRE OPH EXAM NEW PT 1/>: CPT | Performed by: OPHTHALMOLOGY

## 2021-02-04 NOTE — PROGRESS NOTES
Radha Bruce is a 31year old male. HPI:     HPI     Pt in today for a complete eye exam. Pt's last eye exam was on 6/5/17 with [facility]. Pt states vision is stable and mom denies any ocular issues. Pt has Down's Syndrome.     Last edited by Chris Morales EXAM:     Base Eye Exam     Visual Acuity (Snellen - Linear)       Right Left    Dist cc 20/40 +1 20/40 +1    Dist ph cc NI NI    Near cc 20/50+ 20/50+    Correction: Glasses          Tonometry (Icare, 3:46 PM)       Right Left    Pressure 10 10          P palsy- right eye- probably congenital   Congenital. No treatment. Hyperopia of both eyes with astigmatism  New glasses today; update as needed. Discussed that new prescription is only a slight change. Down's syndrome  Doing well.        No orders

## 2021-02-04 NOTE — PATIENT INSTRUCTIONS
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 No treatment. Hyperopia of both eyes with astigmatism  New glasses today; update as needed. Discussed that new prescription is only a slight change.        Down's syndrome  Doing well

## 2021-04-26 NOTE — TELEPHONE ENCOUNTER
Current Outpatient Medications:      Montelukast 10 MG Tablets   Qty 90   Take 1 tablet 10Mg by mouth every night

## 2021-04-27 RX ORDER — MONTELUKAST SODIUM 10 MG/1
10 TABLET ORAL NIGHTLY
Qty: 90 TABLET | Refills: 0 | Status: SHIPPED | OUTPATIENT
Start: 2021-04-27 | End: 2021-07-26

## 2021-07-20 ENCOUNTER — TELEPHONE (OUTPATIENT)
Dept: ALLERGY | Facility: CLINIC | Age: 26
End: 2021-07-20

## 2021-07-20 ENCOUNTER — OFFICE VISIT (OUTPATIENT)
Dept: FAMILY MEDICINE CLINIC | Facility: CLINIC | Age: 26
End: 2021-07-20
Payer: MEDICAID

## 2021-07-20 VITALS
WEIGHT: 216.19 LBS | SYSTOLIC BLOOD PRESSURE: 122 MMHG | HEART RATE: 93 BPM | BODY MASS INDEX: 42.44 KG/M2 | HEIGHT: 60 IN | DIASTOLIC BLOOD PRESSURE: 73 MMHG

## 2021-07-20 DIAGNOSIS — Z91.09 ENVIRONMENTAL ALLERGIES: Primary | ICD-10-CM

## 2021-07-20 PROCEDURE — 3008F BODY MASS INDEX DOCD: CPT | Performed by: FAMILY MEDICINE

## 2021-07-20 PROCEDURE — 3078F DIAST BP <80 MM HG: CPT | Performed by: FAMILY MEDICINE

## 2021-07-20 PROCEDURE — 3074F SYST BP LT 130 MM HG: CPT | Performed by: FAMILY MEDICINE

## 2021-07-20 PROCEDURE — 99214 OFFICE O/P EST MOD 30 MIN: CPT | Performed by: FAMILY MEDICINE

## 2021-07-20 RX ORDER — DOXYCYCLINE HYCLATE 100 MG
100 TABLET ORAL 2 TIMES DAILY
Qty: 20 TABLET | Refills: 0 | Status: SHIPPED | OUTPATIENT
Start: 2021-07-20

## 2021-07-20 NOTE — TELEPHONE ENCOUNTER
Left a message for mother of patient regarding appointment to contact our office. Patient may remain on allergy medications, but will not be able to skin test. Dr. Pina Russo will assess at the time of visit and will address plan of care.

## 2021-07-20 NOTE — TELEPHONE ENCOUNTER
Patient is scheduled for a Consult on 8/25 . Patient is unable to hold antihistamines 5 days prior.      Mother explains pt gags an almost throws up when not on medication

## 2021-07-20 NOTE — PROGRESS NOTES
Blood pressure 122/73, pulse 93, height 4' 11\" (1.499 m), weight 216 lb 3.2 oz (98.1 kg). Patient presents today with mother reporting that he has had a cough whenever he goes outside for the past couple of months.   Patient reports that he coughs u

## 2021-07-26 RX ORDER — MONTELUKAST SODIUM 10 MG/1
TABLET ORAL
Qty: 90 TABLET | Refills: 0 | Status: SHIPPED | OUTPATIENT
Start: 2021-07-26 | End: 2021-10-25

## 2021-08-25 ENCOUNTER — OFFICE VISIT (OUTPATIENT)
Dept: ALLERGY | Facility: CLINIC | Age: 26
End: 2021-08-25
Payer: MEDICAID

## 2021-08-25 ENCOUNTER — LAB ENCOUNTER (OUTPATIENT)
Dept: LAB | Age: 26
End: 2021-08-25
Attending: ALLERGY & IMMUNOLOGY
Payer: COMMERCIAL

## 2021-08-25 VITALS
RESPIRATION RATE: 18 BRPM | WEIGHT: 216 LBS | SYSTOLIC BLOOD PRESSURE: 111 MMHG | HEART RATE: 71 BPM | BODY MASS INDEX: 42.41 KG/M2 | DIASTOLIC BLOOD PRESSURE: 71 MMHG | HEIGHT: 60 IN | OXYGEN SATURATION: 95 %

## 2021-08-25 DIAGNOSIS — R09.82 POST-NASAL DRIP: ICD-10-CM

## 2021-08-25 DIAGNOSIS — J30.89 PERENNIAL ALLERGIC RHINITIS: Primary | ICD-10-CM

## 2021-08-25 DIAGNOSIS — R19.8 GAGGING EPISODE: ICD-10-CM

## 2021-08-25 PROCEDURE — 99244 OFF/OP CNSLTJ NEW/EST MOD 40: CPT | Performed by: ALLERGY & IMMUNOLOGY

## 2021-08-25 PROCEDURE — 3074F SYST BP LT 130 MM HG: CPT | Performed by: ALLERGY & IMMUNOLOGY

## 2021-08-25 PROCEDURE — 3008F BODY MASS INDEX DOCD: CPT | Performed by: ALLERGY & IMMUNOLOGY

## 2021-08-25 PROCEDURE — 3078F DIAST BP <80 MM HG: CPT | Performed by: ALLERGY & IMMUNOLOGY

## 2021-08-25 PROCEDURE — 36415 COLL VENOUS BLD VENIPUNCTURE: CPT | Performed by: ALLERGY & IMMUNOLOGY

## 2021-08-25 PROCEDURE — 82785 ASSAY OF IGE: CPT | Performed by: ALLERGY & IMMUNOLOGY

## 2021-08-25 PROCEDURE — 86003 ALLG SPEC IGE CRUDE XTRC EA: CPT | Performed by: ALLERGY & IMMUNOLOGY

## 2021-08-25 RX ORDER — LEVOCETIRIZINE DIHYDROCHLORIDE 5 MG/1
5 TABLET, FILM COATED ORAL NIGHTLY
Qty: 30 TABLET | Refills: 0 | Status: SHIPPED | OUTPATIENT
Start: 2021-08-25

## 2021-08-25 NOTE — PROGRESS NOTES
Cesar Chavez is a 31year old male. HPI:   Patient presents with: Allergies: Pt presents for allergies and post nasal drip with gagging.   Has seen Dr. Ivey in the past.    Patient is a 59-year-old male who presents with parent for allergy consul Family H/O   • Hypertension Father    • Heart Disorder Father         Stents   • Lipids Father    • Diabetes Neg         No Multiple   • Glaucoma Neg    • Macular degeneration Neg       Social History: Social History    Tobacco Use      Smoking status: Nev normal extraocular motion is intact   Ears/Audiometry: tympanic membranes are normal bilaterally hearing is grossly intact  Nose/Mouth/Throat: nose and throat are clear mucous membranes are moist. Clear pnd,   Neck/Thyroid: neck is supple without adenopath 8/25/2021  Rik Epperson MD      If medication samples were provided today, they were provided solely for patient education and training related to self administration of these medications.   Teaching, instruction and sample was provided to the elijah

## 2021-08-25 NOTE — PATIENT INSTRUCTIONS
Recs:  Check serum IgE profile to common environmental allergens to screen for allergic triggers as we are unable to skin test today as patient is currently on antihistamines  Continue with Singulair and Astelin  Consider trial of Xyzal, levocetirizine 5 m

## 2021-08-26 LAB

## 2021-08-27 ENCOUNTER — TELEPHONE (OUTPATIENT)
Dept: ALLERGY | Facility: CLINIC | Age: 26
End: 2021-08-27

## 2021-08-27 NOTE — TELEPHONE ENCOUNTER
RN called patient's father to go over results. Father verified name and . Spent approximately 20 minutes on phone with father discussing his concerns.   RN advised that it would be best to schedule another follow up with Dr. Ledy Arias to discuss questions throat clearing    RN advised he not be around any smoke exposures as that can cause irritation. RN advised they try Xyzal (denies pt started it yet). He stated he has already tried Prilosec and Prevacid.     Father reports that he may want pt to see a NICHOLE

## 2021-09-02 ENCOUNTER — VIRTUAL PHONE E/M (OUTPATIENT)
Dept: ALLERGY | Facility: CLINIC | Age: 26
End: 2021-09-02
Payer: MEDICAID

## 2021-09-02 DIAGNOSIS — J32.2 CHRONIC ETHMOIDAL SINUSITIS: ICD-10-CM

## 2021-09-02 DIAGNOSIS — R19.8 GAGGING EPISODE: ICD-10-CM

## 2021-09-02 DIAGNOSIS — R05.9 COUGH: Primary | ICD-10-CM

## 2021-09-02 PROCEDURE — 99214 OFFICE O/P EST MOD 30 MIN: CPT | Performed by: ALLERGY & IMMUNOLOGY

## 2021-09-02 NOTE — PROGRESS NOTES
Brandon Beyer is a 31year old male. HPI:   No chief complaint on file. Patient is a 41-year-old male who presents for follow-up with a chief complaint of cough and allergies.     Patient presents for telephone visit    Virtual/Telephone Check-In Relation Age of Onset   • Allergies Other         Family H/O   • Other (Excema) Other         Family H/O   • Other (Excema) Mother    • Other (Excema) Brother    • Other (Retina History) Other         No Family H/O   • Other (Muscle surgery) Other (primary encounter diagnosis)  Gagging episode  Chronic ethmoidal sinusitis    Still with symptoms of gagging and postnasal drip recent serum IgE testing to environmental allergens was negative with a total IgE of 5.5.      Recs:  Check CT of sinuses to scr

## 2021-09-15 ENCOUNTER — LAB ENCOUNTER (OUTPATIENT)
Dept: LAB | Age: 26
End: 2021-09-15
Attending: ALLERGY & IMMUNOLOGY
Payer: COMMERCIAL

## 2021-09-15 DIAGNOSIS — R05.9 COUGH: ICD-10-CM

## 2021-09-15 DIAGNOSIS — R19.8 GAGGING EPISODE: ICD-10-CM

## 2021-09-16 ENCOUNTER — TELEPHONE (OUTPATIENT)
Dept: ALLERGY | Facility: CLINIC | Age: 26
End: 2021-09-16

## 2021-09-16 LAB — SARS-COV-2 RNA RESP QL NAA+PROBE: NOT DETECTED

## 2021-09-16 NOTE — TELEPHONE ENCOUNTER
----- Message from Damaris Del Cid MD sent at 9/16/2021  2:17 PM CDT -----   Please call patient/parent with negative Covid testing. No signs of infection at this time.   Please proceed with upper GI/barium swallow as scheduled

## 2021-09-16 NOTE — TELEPHONE ENCOUNTER
Spoke with father,Alex of patient who is on the SHANNA. verified patient's name and  Informed father of test results per Dr. Juno Headley ( see Dr. Viktor Bashir message below). Father verbalizes understanding, no further questions at this time.

## 2021-09-18 ENCOUNTER — HOSPITAL ENCOUNTER (OUTPATIENT)
Dept: GENERAL RADIOLOGY | Facility: HOSPITAL | Age: 26
Discharge: HOME | End: 2021-09-18
Attending: ALLERGY & IMMUNOLOGY
Payer: COMMERCIAL

## 2021-09-18 ENCOUNTER — HOSPITAL ENCOUNTER (OUTPATIENT)
Dept: CT IMAGING | Facility: HOSPITAL | Age: 26
Discharge: HOME | End: 2021-09-18
Attending: ALLERGY & IMMUNOLOGY
Payer: COMMERCIAL

## 2021-09-18 DIAGNOSIS — J32.2 CHRONIC ETHMOIDAL SINUSITIS: ICD-10-CM

## 2021-09-18 DIAGNOSIS — R05.9 COUGH: ICD-10-CM

## 2021-09-18 DIAGNOSIS — R19.8 GAGGING EPISODE: ICD-10-CM

## 2021-09-18 PROCEDURE — 74220 X-RAY XM ESOPHAGUS 1CNTRST: CPT | Performed by: ALLERGY & IMMUNOLOGY

## 2021-09-18 PROCEDURE — 70486 CT MAXILLOFACIAL W/O DYE: CPT | Performed by: ALLERGY & IMMUNOLOGY

## 2021-09-20 ENCOUNTER — TELEPHONE (OUTPATIENT)
Dept: ALLERGY | Facility: CLINIC | Age: 26
End: 2021-09-20

## 2021-09-20 NOTE — TELEPHONE ENCOUNTER
Spoke with mother of patient. Verified patient's name and . Informed mother of test results and recommendations per Dr. Fahad Leroy. ( see Dr. Villarreal Captain message below). Mother verbalizes understanding , no further questions at this time.         Left a message fo

## 2021-10-25 RX ORDER — MONTELUKAST SODIUM 10 MG/1
10 TABLET ORAL NIGHTLY
Qty: 90 TABLET | Refills: 1 | Status: SHIPPED | OUTPATIENT
Start: 2021-10-25 | End: 2022-04-26

## 2021-10-25 NOTE — TELEPHONE ENCOUNTER
Refill passed per [email or web address] [facility] protocol.   Requested Prescriptions   Pending Prescriptions Disp Refills    MONTELUKAST 10 MG Oral Tab [Pharmacy Med Name: MONTELUKAST 10MG TABLETS] 90 tablet 0     Sig: TAKE 1 TABLET(10 MG) BY MOUTH EVERY NIGHT        Asthma & COPD Medication Protocol Passed - 10/25/2021  2:27 PM        Passed - Appointment in past 6 or next 3 months               Recent Outpatient Visits              1 month ago Cough    Sosa Blankenship MD    [employer] [facility]    2 months ago Perennial allergic rhinitis    [facility], [facility], [address], Jorge Topete MD    Office Visit    3 months ago Environmental allergies    [facility], [address], DO    Office Visit    8 months ago Hyperopia of both eyes with astigmatism    [facility] Gary Pulido MD    Office Visit    12 months ago Sinus congestion    [address] , DO    Telemedicine

## 2022-04-25 ENCOUNTER — TELEPHONE (OUTPATIENT)
Dept: FAMILY MEDICINE CLINIC | Facility: CLINIC | Age: 27
End: 2022-04-25

## 2022-04-26 RX ORDER — MONTELUKAST SODIUM 10 MG/1
10 TABLET ORAL NIGHTLY
Qty: 90 TABLET | Refills: 0 | Status: SHIPPED | OUTPATIENT
Start: 2022-04-26

## 2022-04-26 NOTE — TELEPHONE ENCOUNTER
Refill passed per [employer] [email or web address] protocol.     Requested Prescriptions   Pending Prescriptions Disp Refills    MONTELUKAST 10 MG Oral Tab [Pharmacy Med Name: MONTELUKAST 10MG TABLETS] 90 tablet 1     Sig: TAKE 1 TABLET(10 MG) BY MOUTH EVERY NIGHT        Asthma & COPD Medication Protocol Failed - 4/26/2022  6:08 PM        Failed - Appointment in past 6 or next 3 months              Recent Outpatient Visits              7 months ago Cough    Mukesh Zambrano, Bebe Ayala MD    [facility] [employer] E/M    8 months ago Perennial allergic rhinitis    [facility], [facility], [address], Bebe Ayala MD    Office Visit    9 months ago Environmental allergies    [facility], DO    Office Visit    1 year ago Hyperopia of both eyes with astigmatism    [facility] Rashel Lomax MD    Office Visit    1 year ago Sinus congestion    [address] , [place]    Telemedicine

## 2022-04-27 NOTE — TELEPHONE ENCOUNTER
Phone the patient and father/Gurjit (on Hippa) answered. Father, was given the information below and stated that he needs to speak with his wife and will call back to schedule the appointment. No appointment was scheduled at this time.

## 2022-11-08 ENCOUNTER — TELEPHONE (OUTPATIENT)
Dept: OPHTHALMOLOGY | Facility: CLINIC | Age: 27
End: 2022-11-08

## 2022-11-08 NOTE — TELEPHONE ENCOUNTER
Patients mother states patient was given prescription on 02/2021 and was going to order glasses but prescription is too old. States they were told to return in 2 years.  Please advise

## 2023-02-16 NOTE — TELEPHONE ENCOUNTER
Action Requested: Summary for Provider     []  Critical Lab, Recommendations Needed  [] Need Additional Advice  []   FYI    []   Need Orders  [] Need Medications Sent to Pharmacy  []  Other     SUMMARY:    Appointment scheduled for today 10/27/2020    The Attending Attestation (For Attendings USE Only)...

## 2023-03-11 ENCOUNTER — OFFICE VISIT (OUTPATIENT)
Dept: FAMILY MEDICINE CLINIC | Facility: CLINIC | Age: 28
End: 2023-03-11

## 2023-03-11 VITALS
HEIGHT: 60 IN | SYSTOLIC BLOOD PRESSURE: 117 MMHG | BODY MASS INDEX: 39.66 KG/M2 | WEIGHT: 202 LBS | HEART RATE: 85 BPM | DIASTOLIC BLOOD PRESSURE: 76 MMHG

## 2023-03-11 DIAGNOSIS — T56.0X1D ACUTE LEAD-INDUCED GOUT INVOLVING TOE OF RIGHT FOOT, SUBSEQUENT ENCOUNTER: Primary | ICD-10-CM

## 2023-03-11 DIAGNOSIS — M10.171 ACUTE LEAD-INDUCED GOUT INVOLVING TOE OF RIGHT FOOT, SUBSEQUENT ENCOUNTER: Primary | ICD-10-CM

## 2023-03-11 DIAGNOSIS — E73.9 LACTOSE INTOLERANCE: ICD-10-CM

## 2023-03-11 PROCEDURE — 99213 OFFICE O/P EST LOW 20 MIN: CPT | Performed by: FAMILY MEDICINE

## 2023-03-11 PROCEDURE — 3078F DIAST BP <80 MM HG: CPT | Performed by: FAMILY MEDICINE

## 2023-03-11 PROCEDURE — 3074F SYST BP LT 130 MM HG: CPT | Performed by: FAMILY MEDICINE

## 2023-03-11 PROCEDURE — 3008F BODY MASS INDEX DOCD: CPT | Performed by: FAMILY MEDICINE

## 2023-03-11 RX ORDER — COLCHICINE 0.6 MG/1
0.6 TABLET ORAL 4 TIMES DAILY PRN
Qty: 45 TABLET | Refills: 1 | Status: SHIPPED | OUTPATIENT
Start: 2023-03-11

## 2023-03-11 NOTE — PROGRESS NOTES
Blood pressure 117/76, pulse 85, height 4' 11\" (1.499 m), weight 202 lb (91.6 kg). Patient presents today following up for gout. Recent flareup. Flareups are about once a year. Parents report he has bowel movements after he eats almost every time. He did a lot of lactose products. There is lactose intolerance in the family.     Objective feet with good pulses and intact skin    Assessment history of gout also lactose intolerance    Plan information given and colchicine prescription    Follow-up if no improvement

## 2023-06-26 ENCOUNTER — OFFICE VISIT (OUTPATIENT)
Dept: FAMILY MEDICINE CLINIC | Facility: CLINIC | Age: 28
End: 2023-06-26

## 2023-06-26 VITALS
HEIGHT: 60 IN | WEIGHT: 193 LBS | SYSTOLIC BLOOD PRESSURE: 98 MMHG | HEART RATE: 66 BPM | BODY MASS INDEX: 37.89 KG/M2 | DIASTOLIC BLOOD PRESSURE: 66 MMHG

## 2023-06-26 DIAGNOSIS — K60.2 FISSURE IN ANO: Primary | ICD-10-CM

## 2023-06-26 DIAGNOSIS — L02.91 ABSCESS: ICD-10-CM

## 2023-06-26 PROCEDURE — 3074F SYST BP LT 130 MM HG: CPT | Performed by: FAMILY MEDICINE

## 2023-06-26 PROCEDURE — 99213 OFFICE O/P EST LOW 20 MIN: CPT | Performed by: FAMILY MEDICINE

## 2023-06-26 PROCEDURE — 3008F BODY MASS INDEX DOCD: CPT | Performed by: FAMILY MEDICINE

## 2023-06-26 PROCEDURE — 3078F DIAST BP <80 MM HG: CPT | Performed by: FAMILY MEDICINE

## 2023-06-26 RX ORDER — MOMETASONE FUROATE 1 MG/G
1 CREAM TOPICAL 2 TIMES DAILY PRN
Qty: 50 G | Refills: 0 | Status: SHIPPED | OUTPATIENT
Start: 2023-06-26

## 2023-06-26 NOTE — PROGRESS NOTES
Blood pressure 98/66, pulse 66, height 4' 11\" (1.499 m), weight 193 lb (87.5 kg). Rash in the left armpit  Has been using Lotrimin with some improvement. Also bleeding into his underwear from his buttock. No pain. Drinks a lot of water has daily bowel movements.     Objective anal fissure noted at 6:00 with skin tag and hemorrhoid    Left axilla with some discharge from the sinus and macular hyperpigmented rash    Assessment #1 anal fissure #2 bleeding sinus from axilla #3 lichen simplex chronicus    Plan #1 sitz bath's and witch hazel Tucks pads drink lots of water follow-up with surgery if no improvement #2 culture swab sent #3 Elocon cream apply twice daily for 1 to 2 weeks    Follow-up if no improvement

## 2023-06-30 ENCOUNTER — TELEPHONE (OUTPATIENT)
Dept: FAMILY MEDICINE CLINIC | Facility: CLINIC | Age: 28
End: 2023-06-30

## 2023-07-27 ENCOUNTER — OFFICE VISIT (OUTPATIENT)
Dept: FAMILY MEDICINE CLINIC | Facility: CLINIC | Age: 28
End: 2023-07-27

## 2023-07-27 VITALS
WEIGHT: 185 LBS | HEART RATE: 62 BPM | DIASTOLIC BLOOD PRESSURE: 65 MMHG | BODY MASS INDEX: 36.32 KG/M2 | HEIGHT: 60 IN | SYSTOLIC BLOOD PRESSURE: 108 MMHG

## 2023-07-27 DIAGNOSIS — K60.2 FISSURE IN ANO: Primary | ICD-10-CM

## 2023-07-27 PROCEDURE — 3074F SYST BP LT 130 MM HG: CPT | Performed by: FAMILY MEDICINE

## 2023-07-27 PROCEDURE — 99213 OFFICE O/P EST LOW 20 MIN: CPT | Performed by: FAMILY MEDICINE

## 2023-07-27 PROCEDURE — 3008F BODY MASS INDEX DOCD: CPT | Performed by: FAMILY MEDICINE

## 2023-07-27 PROCEDURE — 3078F DIAST BP <80 MM HG: CPT | Performed by: FAMILY MEDICINE

## 2023-07-27 NOTE — PROGRESS NOTES
Blood pressure 108/65, pulse 62, height 4' 11\" (1.499 m), weight 185 lb (83.9 kg). Complains of intermittent rectal pain. Father present reports patient had previous bowel movements however more recently has had fewer bowel movements. Patient was diagnosed with fissures and hemorrhoids previously. Patient uses a lot of toilet paper after having bowel movements.     Objective patient is appropriate no apparent distress    Rectal exam with hemorrhoid noted also fissure noted at 6:00    Assessment #1 fissures    Plan nifedipine ointment prescription and Balneol to be used after bowel movements also may place Desitin cream    Follow-up in 1 week

## 2023-08-03 ENCOUNTER — OFFICE VISIT (OUTPATIENT)
Dept: FAMILY MEDICINE CLINIC | Facility: CLINIC | Age: 28
End: 2023-08-03

## 2023-08-03 VITALS
DIASTOLIC BLOOD PRESSURE: 64 MMHG | BODY MASS INDEX: 36.12 KG/M2 | HEIGHT: 60 IN | HEART RATE: 68 BPM | WEIGHT: 184 LBS | SYSTOLIC BLOOD PRESSURE: 100 MMHG

## 2023-08-03 DIAGNOSIS — K60.2 FISSURE IN ANO: Primary | ICD-10-CM

## 2023-08-03 PROCEDURE — 3008F BODY MASS INDEX DOCD: CPT | Performed by: FAMILY MEDICINE

## 2023-08-03 PROCEDURE — 99213 OFFICE O/P EST LOW 20 MIN: CPT | Performed by: FAMILY MEDICINE

## 2023-08-03 PROCEDURE — 3078F DIAST BP <80 MM HG: CPT | Performed by: FAMILY MEDICINE

## 2023-08-03 PROCEDURE — 3074F SYST BP LT 130 MM HG: CPT | Performed by: FAMILY MEDICINE

## 2023-08-03 NOTE — PATIENT INSTRUCTIONS
HYDROCORTISONE 1% CREAM 3 TIMES DAILY FOR 4 DAYS     STOP NIFEDIPINE AND DESITIN     MAY CONTINUE BALNEOL

## 2023-08-03 NOTE — PROGRESS NOTES
Blood pressure 100/64, pulse 68, height 4' 11\" (1.499 m), weight 184 lb (83.5 kg). Patient presents today following up for fissures. Denies any pain at this time. No pain with bowel movements. Has appointment scheduled gastroenterology.     Objective perirectal area with erythematous maculopapular rash noted    Fissure at 6:00 healing well    Assessment #1 fissure healing well #2 rash possibly contact Derm    Plan #1 May discontinue nifedipine and Desitin for now #2 hydrocortisone cream for 4 days    Follow-up if pain recurs    Keep appointment with gastroenterology

## 2023-09-06 ENCOUNTER — OFFICE VISIT (OUTPATIENT)
Facility: CLINIC | Age: 28
End: 2023-09-06

## 2023-09-06 VITALS
HEIGHT: 60 IN | DIASTOLIC BLOOD PRESSURE: 72 MMHG | BODY MASS INDEX: 35.63 KG/M2 | WEIGHT: 181.5 LBS | HEART RATE: 76 BPM | SYSTOLIC BLOOD PRESSURE: 106 MMHG

## 2023-09-06 DIAGNOSIS — K64.9 HEMORRHOIDS, UNSPECIFIED HEMORRHOID TYPE: ICD-10-CM

## 2023-09-06 DIAGNOSIS — L29.0 ANAL PRURITUS: Primary | ICD-10-CM

## 2023-09-06 PROCEDURE — 3078F DIAST BP <80 MM HG: CPT | Performed by: STUDENT IN AN ORGANIZED HEALTH CARE EDUCATION/TRAINING PROGRAM

## 2023-09-06 PROCEDURE — 3074F SYST BP LT 130 MM HG: CPT | Performed by: STUDENT IN AN ORGANIZED HEALTH CARE EDUCATION/TRAINING PROGRAM

## 2023-09-06 PROCEDURE — 99204 OFFICE O/P NEW MOD 45 MIN: CPT | Performed by: STUDENT IN AN ORGANIZED HEALTH CARE EDUCATION/TRAINING PROGRAM

## 2023-09-06 PROCEDURE — 3008F BODY MASS INDEX DOCD: CPT | Performed by: STUDENT IN AN ORGANIZED HEALTH CARE EDUCATION/TRAINING PROGRAM

## 2023-09-06 NOTE — H&P
[facility], [facility] - Gastroenterology                                                                                                               Reason for consult: altered bowel habits, hemorrhoids, fissure    Requesting physician or provider: Rodolfo Zuniga. Aga Ansari DO    HPI:   Mariano Fleming is a 33year old year-old man with history of Down syndrome who presents with his mother today for altered bowel habits and blood on the toilet paper when wiping. History is difficult to obtain as the patient is not a great historian given medical history. Over the last several months to year the patient has been wiping more frequently than normal.  The patient is unclear why. He denies any pain in his stool passes through the anus. He denies any blood in the toilet bowl water. He denies blood mixed with the stool. He used to consume a significant amount of cheese which was causing abdominal bloating and looser stool. He did stop eating cheese and has lost 20 pounds over the last 6 months. He also started using Lactaid which has helped improve his bowel habits and reduced his symptoms of abdominal bloating. However, he does continue to wipe frequently for unclear reasons. He was seen by his primary care physician who reportedly saw an anal fissure on exam.  He was given nifedipine ointment to take 3 times per day which did improve the fissure. However, he reportedly developed a \"rash\" in the perianal region. The stool itself continues to be brown and formed. The patient overall does seem to be doing better and has no significant complaints at today's visit.       Prior endoscopies:  none    Soc:  -no smoking  -no Etoh    Wt Readings from Last 6 Encounters:  09/06/23 : 181 lb 8 oz (82.3 kg)  08/03/23 : 184 lb (83.5 kg)  07/27/23 : 185 lb (83.9 kg)  06/26/23 : 193 lb (87.5 kg)  03/11/23 : 202 lb (91.6 kg)  08/25/21 : 216 lb (98 kg)       History, Medications, Allergies, ROS:      Past Medical History:   Diagnosis Date    Allergic rhinitis     Anal fissure     Astigmatism 2007    Blepharitis 03/02/2015    Down's syndrome 1994    Hemorrhoids     Hyperopia of both eyes with astigmatism 03/02/2015    Hypertropia 2007    Ophthalmology follow-up encounter 2007    High Hyperopia    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  03/02/2015      Past Surgical History:   Procedure Laterality Date    MYRINGOTOMY, LASER-ASSISTED        Family Hx:   Family History   Problem Relation Age of Onset    Allergies Other         Family H/O    Other (Excema) Other         Family H/O    Other (Excema) Mother     Other (Excema) Brother     Other (Retina History) Other         No Family H/O    Other (Muscle surgery) Other         No Family H/O    Hypertension Father     Heart Disorder Father         Stents    Lipids Father     Diabetes Neg         No Multiple    Glaucoma Neg     Macular degeneration Neg       Social History:   Social History     Socioeconomic History    Marital status: Single   Tobacco Use    Smoking status: Never    Smokeless tobacco: Never   Vaping Use    Vaping Use: Never used   Substance and Sexual Activity    Alcohol use: No    Drug use: Never   Other Topics Concern    Caffeine Concern No        Medications (Active prior to today's visit):  Current Outpatient Medications   Medication Sig Dispense Refill    Nifedipine Ointment 0.2% in pastibase base Apply 1 Application topically 3 (three) times daily. Apply pea sized amount three times a day as directed    Nifedipine Ointment 0.2% in pastibase base 1 Tube 1    Mometasone Furoate 0.1 % External Cream Apply 1 Application topically 2 (two) times daily as needed. 50 g 0    colchicine 0.6 MG Oral Tab Take 1 tablet (0.6 mg total) by mouth 4 (four) times daily as needed. 45 tablet 1    montelukast 10 MG Oral Tab Take 1 tablet (10 mg total) by mouth nightly.  [address] tablet 0    levocetirizine (XYZAL) 5 MG Oral Tab Take 1 tablet (5 mg total) by mouth nightly. 30 tablet 0    Azelastine HCl 0.1 % Nasal Solution 1 spray by Nasal route 2 (two) times daily. 1 Bottle 1       Allergies:    Augmentin, [Amoxici*    RASH    Comment:As an infant    ROS:   CONSTITUTIONAL:  negative for fevers, rigors  EYES:  negative for diplopia   RESPIRATORY:  negative for severe shortness of breath  CARDIOVASCULAR:  negative for crushing sub-sternal chest pain  GASTROINTESTINAL:  see HPI  GENITOURINARY:  negative for dysuria or gross hematuria  INTEGUMENT/BREAST:  SKIN:  negative for jaundice   ALLERGIC/IMMUNOLOGIC:  negative for hay fever  ENDOCRINE:  negative for cold intolerance and heat intolerance  MUSCULOSKELETAL:  negative for joint effusion/severe erythema  BEHAVIOR/PSYCH:  negative for psychotic behavior      PHYSICAL EXAM:   Blood pressure 106/72, pulse 76, height 4' 11\" (1.499 m), weight 181 lb 8 oz (82.3 kg). Gen- Patient appears comfortable and in no acute discomfort  HEENT: the sclera appears anicteric, oropharynx clear, mucus membranes appear moist  CV- regular rate and rhythm, the extremities are warm and well perfused   Lung- Moves air well; No labored breathing  Abdomen- soft, non-tender exam in all quadrants without rigidity or guarding, non-distended  Skin- No jaundice  Ext: no edema is evident. Neuro- Alert and interactive, and gross movements of extremities normal  Psych - appropriate, non-agitated  Rectal - perianal skin tag. NO fissure. Internal hemorrhoids palpated. Brown stool on gloved finger. Labs/Imaging:     Patient's pertinent labs and imaging were reviewed and discussed with patient today. ASSESSMENT/PLAN:   Tamara Becerra is a 33year old year-old man with history of Down syndrome who presents with his mother today for altered bowel habits and blood on the toilet paper when wiping.   History is difficult to obtain as the patient is not a great historian given medical history. #Altered bowel habits  #Hx of anal fissure  #Hx of hemorrhoids  This is a difficult clinical situation to assess given the lack of history provided by the patient's and unclear bowel habits. The patient did respond frequently to questions stating \"sometimes. \"    The patient reportedly had an anal fissure and was given nifedipine ointment for this to take 3 times per day. I did not appreciate a fissure on today's exam.  It does not seem that the patient complained of pain as stool passes through the anus. However, either way there was no fissure present. He did have palpable internal hemorrhoids and a perianal skin tag that was visible during the examination. There is no palpable mass or blood on the gloved finger during the rectal examination. Suspect the patient's issue is related to internal hemorrhoids further exacerbated by straining and passing small/hard stools. I think we should start by treating constipation/hemorrhoids to see if this improves his problem. I do think that reducing the intake of dairy has helped and the patient should continue the reduced amount. Recommendations:  1. Use wet towelettes (Kleenix, Tucks, Valentín) to clean the anal area after bowel movements and then pat dry the area with dry toilet paper. 2. Start daily Metamucil and use one to two scoops per day. 3. Use Zinc Oxide (Desitin is fine) paste 1 to 2 times per day to protect the skin in the anal region. Orders This Visit:  No orders of the defined types were placed in this encounter. Meds This Visit:  Requested Prescriptions      No prescriptions requested or ordered in this encounter     Imaging & Referrals:  None     Darrion Heller MD  [facility], [facility] Gastroenterology  9/6/2023    This note was partially prepared using [employer] [place] [employer] voice recognition dictation software. As a result, errors may occur. When identified, these errors have been corrected.  While every attempt is made to correct errors during dictation, discrepancies may still exist.

## 2023-09-06 NOTE — PATIENT INSTRUCTIONS
1. Use wet towelettes (Kleenix, Tucks, Valentín) to clean the anal area after bowel movements and then pat dry the area with dry toilet paper. 2. Start daily Metamucil and use one to two scoops per day. 3. Use Zinc Oxide (Desitin is fine) paste 1 to 2 times per day to protect the skin in the anal region.

## 2024-07-12 ENCOUNTER — LAB ENCOUNTER (OUTPATIENT)
Dept: LAB | Age: 29
End: 2024-07-12
Attending: FAMILY MEDICINE
Payer: COMMERCIAL

## 2024-07-12 ENCOUNTER — OFFICE VISIT (OUTPATIENT)
Dept: FAMILY MEDICINE CLINIC | Facility: CLINIC | Age: 29
End: 2024-07-12

## 2024-07-12 ENCOUNTER — HOSPITAL ENCOUNTER (OUTPATIENT)
Dept: GENERAL RADIOLOGY | Age: 29
Discharge: HOME | End: 2024-07-12
Attending: FAMILY MEDICINE
Payer: COMMERCIAL

## 2024-07-12 VITALS
RESPIRATION RATE: 18 BRPM | WEIGHT: 178 LBS | HEART RATE: 56 BPM | BODY MASS INDEX: 34.95 KG/M2 | DIASTOLIC BLOOD PRESSURE: 64 MMHG | HEIGHT: 60 IN | SYSTOLIC BLOOD PRESSURE: 102 MMHG

## 2024-07-12 DIAGNOSIS — R14.0 BLOATING: ICD-10-CM

## 2024-07-12 DIAGNOSIS — R63.4 WEIGHT LOSS: ICD-10-CM

## 2024-07-12 DIAGNOSIS — R63.4 WEIGHT LOSS: Primary | ICD-10-CM

## 2024-07-12 LAB
ALBUMIN SERPL-MCNC: 4.5 G/DL (ref 3.2–4.8)
ALBUMIN/GLOB SERPL: 1.3 {RATIO} (ref 1–2)
ALP LIVER SERPL-CCNC: 57 U/L
ALT SERPL-CCNC: 25 U/L
ANION GAP SERPL CALC-SCNC: 4 MMOL/L (ref 0–18)
AST SERPL-CCNC: 21 U/L (ref ?–34)
BASOPHILS # BLD AUTO: 0.08 X10(3) UL (ref 0–0.2)
BASOPHILS NFR BLD AUTO: 1.3 %
BILIRUB SERPL-MCNC: 0.8 MG/DL (ref 0.3–1.2)
BILIRUB UR QL: NEGATIVE
BUN BLD-MCNC: 12 MG/DL (ref 9–23)
BUN/CREAT SERPL: 9.5 (ref 10–20)
CALCIUM BLD-MCNC: 9.9 MG/DL (ref 8.7–10.4)
CHLORIDE SERPL-SCNC: 105 MMOL/L (ref 98–112)
CHOLEST SERPL-MCNC: 161 MG/DL (ref ?–200)
CLARITY UR: CLEAR
CO2 SERPL-SCNC: 33 MMOL/L (ref 21–32)
COLOR UR: COLORLESS
CREAT BLD-MCNC: 1.26 MG/DL
DEPRECATED RDW RBC AUTO: 42.4 FL (ref 35.1–46.3)
EGFRCR SERPLBLD CKD-EPI 2021: 79 ML/MIN/1.73M2 (ref 60–?)
EOSINOPHIL # BLD AUTO: 0.07 X10(3) UL (ref 0–0.7)
EOSINOPHIL NFR BLD AUTO: 1.2 %
ERYTHROCYTE [DISTWIDTH] IN BLOOD BY AUTOMATED COUNT: 13.1 % (ref 11–15)
FASTING PATIENT LIPID ANSWER: NO
FASTING STATUS PATIENT QL REPORTED: NO
GLOBULIN PLAS-MCNC: 3.4 G/DL (ref 2–3.5)
GLUCOSE BLD-MCNC: 78 MG/DL (ref 70–99)
GLUCOSE UR-MCNC: NORMAL MG/DL
HCT VFR BLD AUTO: 48.6 %
HDLC SERPL-MCNC: 54 MG/DL (ref 40–59)
HGB BLD-MCNC: 16.4 G/DL
HGB UR QL STRIP.AUTO: NEGATIVE
IMM GRANULOCYTES # BLD AUTO: 0.03 X10(3) UL (ref 0–1)
IMM GRANULOCYTES NFR BLD: 0.5 %
KETONES UR-MCNC: NEGATIVE MG/DL
LDLC SERPL CALC-MCNC: 94 MG/DL (ref ?–100)
LEUKOCYTE ESTERASE UR QL STRIP.AUTO: NEGATIVE
LYMPHOCYTES # BLD AUTO: 1.55 X10(3) UL (ref 1–4)
LYMPHOCYTES NFR BLD AUTO: 25.6 %
MCH RBC QN AUTO: 30.2 PG (ref 26–34)
MCHC RBC AUTO-ENTMCNC: 33.7 G/DL (ref 31–37)
MCV RBC AUTO: 89.5 FL
MONOCYTES # BLD AUTO: 0.35 X10(3) UL (ref 0.1–1)
MONOCYTES NFR BLD AUTO: 5.8 %
NEUTROPHILS # BLD AUTO: 3.98 X10 (3) UL (ref 1.5–7.7)
NEUTROPHILS # BLD AUTO: 3.98 X10(3) UL (ref 1.5–7.7)
NEUTROPHILS NFR BLD AUTO: 65.6 %
NITRITE UR QL STRIP.AUTO: NEGATIVE
NONHDLC SERPL-MCNC: 107 MG/DL (ref ?–130)
OSMOLALITY SERPL CALC.SUM OF ELEC: 293 MOSM/KG (ref 275–295)
PH UR: 6 [PH] (ref 5–8)
PLATELET # BLD AUTO: 212 10(3)UL (ref 150–450)
POTASSIUM SERPL-SCNC: 4.4 MMOL/L (ref 3.5–5.1)
PROT SERPL-MCNC: 7.9 G/DL (ref 5.7–8.2)
PROT UR-MCNC: NEGATIVE MG/DL
RBC # BLD AUTO: 5.43 X10(6)UL
SODIUM SERPL-SCNC: 142 MMOL/L (ref 136–145)
SP GR UR STRIP: <1.005 (ref 1–1.03)
T4 FREE SERPL-MCNC: 1.1 NG/DL (ref 0.8–1.7)
TRIGL SERPL-MCNC: 68 MG/DL (ref 30–149)
TSI SER-ACNC: 5.07 MIU/ML (ref 0.55–4.78)
UROBILINOGEN UR STRIP-ACNC: NORMAL
VIT D+METAB SERPL-MCNC: 5.5 NG/ML (ref 30–100)
VLDLC SERPL CALC-MCNC: 11 MG/DL (ref 0–30)
WBC # BLD AUTO: 6.1 X10(3) UL (ref 4–11)

## 2024-07-12 PROCEDURE — 81003 URINALYSIS AUTO W/O SCOPE: CPT

## 2024-07-12 PROCEDURE — 36415 COLL VENOUS BLD VENIPUNCTURE: CPT

## 2024-07-12 PROCEDURE — 80061 LIPID PANEL: CPT

## 2024-07-12 PROCEDURE — 3008F BODY MASS INDEX DOCD: CPT | Performed by: FAMILY MEDICINE

## 2024-07-12 PROCEDURE — 74018 RADEX ABDOMEN 1 VIEW: CPT | Performed by: FAMILY MEDICINE

## 2024-07-12 PROCEDURE — 80053 COMPREHEN METABOLIC PANEL: CPT

## 2024-07-12 PROCEDURE — 85025 COMPLETE CBC W/AUTO DIFF WBC: CPT

## 2024-07-12 PROCEDURE — 84443 ASSAY THYROID STIM HORMONE: CPT

## 2024-07-12 PROCEDURE — 3074F SYST BP LT 130 MM HG: CPT | Performed by: FAMILY MEDICINE

## 2024-07-12 PROCEDURE — 84439 ASSAY OF FREE THYROXINE: CPT

## 2024-07-12 PROCEDURE — 82306 VITAMIN D 25 HYDROXY: CPT

## 2024-07-12 PROCEDURE — 3078F DIAST BP <80 MM HG: CPT | Performed by: FAMILY MEDICINE

## 2024-07-12 PROCEDURE — 99213 OFFICE O/P EST LOW 20 MIN: CPT | Performed by: FAMILY MEDICINE

## 2024-07-18 ENCOUNTER — HOSPITAL ENCOUNTER (EMERGENCY)
Facility: HOSPITAL | Age: 29
Discharge: HOME | End: 2024-07-18
Attending: EMERGENCY MEDICINE
Payer: COMMERCIAL

## 2024-07-18 ENCOUNTER — APPOINTMENT (OUTPATIENT)
Dept: GENERAL RADIOLOGY | Facility: HOSPITAL | Age: 29
End: 2024-07-18
Attending: EMERGENCY MEDICINE
Payer: COMMERCIAL

## 2024-07-18 VITALS
RESPIRATION RATE: 19 BRPM | SYSTOLIC BLOOD PRESSURE: 107 MMHG | HEART RATE: 49 BPM | WEIGHT: 178 LBS | DIASTOLIC BLOOD PRESSURE: 64 MMHG | TEMPERATURE: 97 F | OXYGEN SATURATION: 99 % | BODY MASS INDEX: 36 KG/M2

## 2024-07-18 DIAGNOSIS — H81.399 PERIPHERAL VERTIGO, UNSPECIFIED LATERALITY: Primary | ICD-10-CM

## 2024-07-18 LAB
ALBUMIN SERPL-MCNC: 4.2 G/DL (ref 3.2–4.8)
ALBUMIN/GLOB SERPL: 1.3 {RATIO} (ref 1–2)
ALP LIVER SERPL-CCNC: 53 U/L
ALT SERPL-CCNC: 22 U/L
ANION GAP SERPL CALC-SCNC: 3 MMOL/L (ref 0–18)
AST SERPL-CCNC: 21 U/L (ref ?–34)
BASOPHILS # BLD AUTO: 0.09 X10(3) UL (ref 0–0.2)
BASOPHILS NFR BLD AUTO: 1.3 %
BILIRUB SERPL-MCNC: 0.6 MG/DL (ref 0.3–1.2)
BUN BLD-MCNC: 11 MG/DL (ref 9–23)
BUN/CREAT SERPL: 8.7 (ref 10–20)
CALCIUM BLD-MCNC: 9.4 MG/DL (ref 8.7–10.4)
CHLORIDE SERPL-SCNC: 106 MMOL/L (ref 98–112)
CO2 SERPL-SCNC: 30 MMOL/L (ref 21–32)
CREAT BLD-MCNC: 1.27 MG/DL
DEPRECATED RDW RBC AUTO: 41 FL (ref 35.1–46.3)
EGFRCR SERPLBLD CKD-EPI 2021: 78 ML/MIN/1.73M2 (ref 60–?)
EOSINOPHIL # BLD AUTO: 0.06 X10(3) UL (ref 0–0.7)
EOSINOPHIL NFR BLD AUTO: 0.9 %
ERYTHROCYTE [DISTWIDTH] IN BLOOD BY AUTOMATED COUNT: 12.6 % (ref 11–15)
GLOBULIN PLAS-MCNC: 3.2 G/DL (ref 2–3.5)
GLUCOSE BLD-MCNC: 82 MG/DL (ref 70–99)
HCT VFR BLD AUTO: 45 %
HGB BLD-MCNC: 15.4 G/DL
IMM GRANULOCYTES # BLD AUTO: 0.03 X10(3) UL (ref 0–1)
IMM GRANULOCYTES NFR BLD: 0.4 %
LIPASE SERPL-CCNC: 91 U/L (ref 12–53)
LYMPHOCYTES # BLD AUTO: 1.74 X10(3) UL (ref 1–4)
LYMPHOCYTES NFR BLD AUTO: 26 %
MCH RBC QN AUTO: 30.3 PG (ref 26–34)
MCHC RBC AUTO-ENTMCNC: 34.2 G/DL (ref 31–37)
MCV RBC AUTO: 88.6 FL
MONOCYTES # BLD AUTO: 0.45 X10(3) UL (ref 0.1–1)
MONOCYTES NFR BLD AUTO: 6.7 %
NEUTROPHILS # BLD AUTO: 4.33 X10 (3) UL (ref 1.5–7.7)
NEUTROPHILS # BLD AUTO: 4.33 X10(3) UL (ref 1.5–7.7)
NEUTROPHILS NFR BLD AUTO: 64.7 %
OSMOLALITY SERPL CALC.SUM OF ELEC: 286 MOSM/KG (ref 275–295)
PLATELET # BLD AUTO: 198 10(3)UL (ref 150–450)
POTASSIUM SERPL-SCNC: 4.6 MMOL/L (ref 3.5–5.1)
PROT SERPL-MCNC: 7.4 G/DL (ref 5.7–8.2)
RBC # BLD AUTO: 5.08 X10(6)UL
SODIUM SERPL-SCNC: 139 MMOL/L (ref 136–145)
WBC # BLD AUTO: 6.7 X10(3) UL (ref 4–11)

## 2024-07-18 PROCEDURE — 99285 EMERGENCY DEPT VISIT HI MDM: CPT

## 2024-07-18 PROCEDURE — 80053 COMPREHEN METABOLIC PANEL: CPT | Performed by: EMERGENCY MEDICINE

## 2024-07-18 PROCEDURE — 96360 HYDRATION IV INFUSION INIT: CPT

## 2024-07-18 PROCEDURE — 93005 ELECTROCARDIOGRAM TRACING: CPT

## 2024-07-18 PROCEDURE — 83690 ASSAY OF LIPASE: CPT | Performed by: EMERGENCY MEDICINE

## 2024-07-18 PROCEDURE — 93010 ELECTROCARDIOGRAM REPORT: CPT

## 2024-07-18 PROCEDURE — 85025 COMPLETE CBC W/AUTO DIFF WBC: CPT | Performed by: EMERGENCY MEDICINE

## 2024-07-18 PROCEDURE — 74018 RADEX ABDOMEN 1 VIEW: CPT | Performed by: EMERGENCY MEDICINE

## 2024-07-18 RX ORDER — MECLIZINE HYDROCHLORIDE 25 MG/1
25 TABLET ORAL 3 TIMES DAILY PRN
Qty: 20 TABLET | Refills: 0 | Status: SHIPPED | OUTPATIENT
Start: 2024-07-18

## 2024-07-18 NOTE — ED PROVIDER NOTES
Patient Seen in: [facility] Emergency Department    History     Chief Complaint   Patient presents with    Dizziness       HPI    29-year-old male with history of Down syndrome, presents to the ED for evaluation of dizziness.  Mom states that patient began complaining of dizziness today.  He has had some abdominal bloating and constipation.  Patient was seen by his primary care doctor and placed on MiraLAX.  Mom states that they did take MiraLAX for the past 3 to 4 days and patient now is having multiple large bowel movements and started having some loose stool.  He has not had any nausea, vomiting, fever, chills, chest pain, palpitations.  Patient states that he still is having some intermittent abdominal discomfort but none currently    History from Independent Source: Mother gave initial history as stated in [facility]    External Records Reviewed: Reviewed primary care notes from 12 June.  Patient had a KUB and fasting blood test completed.  KUB showed some evidence of right colonic constipation.  Lab work was largely unremarkable.    History reviewed.   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   Past Surgical History:   Procedure Laterality Date    Myringotomy, laser-assisted           Medications :  (Not in a hospital admission)       Family History   Problem Relation Age of Onset    Allergies Other         Family H/O    Other (Excema) Other         Family H/O    Other (Excema) Mother     Other (Excema) Brother     Other (Retina History) Other         No Family H/O    Other (Muscle surgery) Other         No Family H/O    Hypertension Father     Heart Disorder Father         Stents    Lipids Father     Diabetes Neg         No  Multiple    Glaucoma Neg     Macular degeneration Neg        Smoking Status:   Social History     Socioeconomic History    Marital status: Single   Tobacco Use    Smoking status: Never    Smokeless tobacco: Never   Vaping Use    Vaping status: Never Used   Substance and Sexual Activity    Alcohol use: No    Drug use: Never   Other Topics Concern    Caffeine Concern No       Constitutional and vital signs reviewed.      Social History and Family History elements reviewed from today, pertinent positives to the presenting problem noted.    Physical Exam     ED Triage Vitals [07/18/24 1530]   /74   Pulse 60   Resp 20   Temp 97.4 °F (36.3 °C)   Temp src Temporal   SpO2 99 %   O2 Device None (Room air)       Physical Exam   Constitutional: AAOx3, well nourished, NAD  HEENT: Normocephalic, PERRLA, MMM  CV: s1s2+, RRR, no m/r/g, normal distal pulses  Pulmonary/Chest: CTA b/l with no rales, wheezes.  No chest wall tenderness  Abdominal: Mild epigastric tenderness without rebound or guarding.  Nondistended. Soft. Bowel sounds are normal.   Neck/Back:   :   Musculoskeletal: Normal range of motion. No deformity.   Neurological: Awake, alert. Normal reflexes. No cranial nerve deficit.    Skin: Skin is warm and dry. No rash noted. No erythema.   Psychiatric:      All measures to prevent infection transmission during my interaction with the patient were taken. The patient was already wearing a droplet mask on my arrival to the room. Personal protective equipment was worn throughout the duration of the exam.      ED Course        Labs Reviewed   COMP METABOLIC PANEL (14) - Abnormal; Notable for the following components:       Result Value    BUN/CREA Ratio 8.7 (*)     All other components within normal limits   LIPASE - Abnormal; Notable for the following components:    Lipase 91 (*)     All other components within normal limits   CBC WITH DIFFERENTIAL WITH PLATELET    Narrative:     The following orders were created for  panel order CBC With Differential With Platelet.                  Procedure                               Abnormality         Status                                     ---------                               -----------         ------                                     CBC W/ DIFFERENTIAL[[id number]]                              Final result                                                 Please view results for these tests on the individual orders.   RAINBOW DRAW LAVENDER   RAINBOW DRAW LIGHT GREEN   RAINBOW DRAW BLUE   RAINBOW DRAW GOLD   CBC W/ DIFFERENTIAL     My Independent Interpretation of EKG (if performed): EKG shows heart rate of 51 bpm, sinus bradycardia, no acute ST changes, normal axis and intervals, no ectopy    Monitor Interpretation:   Sinus bradycardia at a rate of 50 bpm as interpreted by me.      Imaging Results Available and Reviewed while in ED: XR ABDOMEN (1 VIEW) (CPT=74018)    Result Date: 7/18/2024  CONCLUSION: Nonspecific-nonobstructive bowel gas pattern.    Dictated by (CST): Miguel Moreno MD on 7/18/2024 at 5:26 PM     Finalized by (CST): Miguel Moreno MD on 7/18/2024 at 5:26 PM         ED Medications Administered:   Medications   sodium chloride 0.9 % IV bolus 1,000 mL (0 mL Intravenous Stopped 7/18/24 1740)             MDM     Vitals:    07/18/24 1645 07/18/24 1700 07/18/24 1715 07/18/24 1745   BP: 106/74 103/82 104/65 107/64   Pulse: 62 50 (!) 49 (!) 49   Resp: 17 16 15 19   Temp:       TempSrc:       SpO2: 98% 98% 98% 99%   Weight:         *I personally reviewed and interpreted all ED vitals.    Independent Interpretation of Studies: I have independently reviewed patient's obstructive series and there are no signs of constipation at this time    Social Determinants of Health:     Procedures:      Differential/MDM/Shared Decision Making: Differential Diagnosis includes vertigo, dysrhythmia, dehydration, electrolyte abnormality, hypoglycemia, others.      The patient already has history  of Down syndrome, constipation, to contribute to the complexity of this ED evaluation.           Well.  Vitals have remained stable.  I believe patient may have mild vertigo.  Discussed case with patient and mother, they are comfortable discharge on meclizine.      Condition upon leaving the department: Stable    Disposition and Plan     Clinical Impression:  1. Peripheral vertigo, unspecified laterality        Disposition:  Discharge    Follow-up:  Bartolo Nuñez, DO  [address] [place] [address] [phone]    Call in 2 day(s)        Medications Prescribed:  Current Discharge Medication List        START taking these medications    Details   meclizine 25 MG Oral Tab Take 1 tablet (25 mg total) by mouth 3 (three) times daily as needed for Dizziness.  Qty: 20 tablet, Refills: 0

## 2024-07-18 NOTE — ED INITIAL ASSESSMENT (HPI)
Patient arrives to ER for evaluation of dizziness. Patient has been constipated and taking miralax for the last 3 days. Patient today started experiencing dizziness.

## 2024-07-19 LAB
ATRIAL RATE: 51 BPM
P AXIS: 38 DEGREES
P-R INTERVAL: 128 MS
Q-T INTERVAL: 420 MS
QRS DURATION: 88 MS
QTC CALCULATION (BEZET): 387 MS
R AXIS: 52 DEGREES
T AXIS: 29 DEGREES
VENTRICULAR RATE: 51 BPM

## (undated) NOTE — LETTER
06/13/18        Gayatri Mathews  [facility] [address]      Dear Sherine Anderson,    [id number] records indicate that you have outstanding lab work and or testing that was ordered for you and has not yet been completed:          TSH W Reflex To Jose

## (undated) NOTE — Clinical Note
June 5, 2017    [address] [facility] [address]     Patient: Jorge Alberto Ann   YOB: 1995   Date of Visit: 6/5/2017       Dear Dr. Parmjit Burgos DO:    Thank you for referring Maryanne Choi to me for eval daily. Disp: 1 Bottle Rfl: 2   DYMISTA 137-50 MCG/ACT Nasal Suspension  Disp:  Rfl: 1   DiphenhydrAMINE HCl (WAL-DRYL ALLERGY OR) Take by mouth.  Disp:  Rfl:        Allergies:    Augmentin, [Amoxici*    Rash    Comment:As an infant    ROS:     ROS     Posit Final Rx      Sphere Cylinder Axis   Right +5.50 +1.50 045   Left +5.00 +2.50 135       Expiration Date:  6/6/2018                 ASSESSMENT/PLAN:     Diagnoses and Plan:     Down's syndrome  Doing well    Hyperopia of both eyes with astigmatism  New Rx

## (undated) NOTE — MR AVS SNAPSHOT
[facility]  [address]  [phone]               Thank you for choosing us for your health care visit with [facility].  Jignesh Zavala MD.  We are glad to serve you and happy to provide you with this oliver WAL-DRYL ALLERGY OR   Take by mouth. [employer]     Call the [facility] for assistance with your inactive [employer] account    If you have questions, you can call [phone] to talk to our [facility] Staff.  Remember, [employer] is NOT to